# Patient Record
Sex: MALE | Race: ASIAN | NOT HISPANIC OR LATINO
[De-identification: names, ages, dates, MRNs, and addresses within clinical notes are randomized per-mention and may not be internally consistent; named-entity substitution may affect disease eponyms.]

---

## 2020-02-14 ENCOUNTER — APPOINTMENT (OUTPATIENT)
Dept: ORTHOPEDIC SURGERY | Facility: CLINIC | Age: 60
End: 2020-02-14
Payer: MEDICARE

## 2020-02-14 PROBLEM — Z00.00 ENCOUNTER FOR PREVENTIVE HEALTH EXAMINATION: Status: ACTIVE | Noted: 2020-02-14

## 2020-02-24 ENCOUNTER — APPOINTMENT (OUTPATIENT)
Dept: ORTHOPEDIC SURGERY | Facility: CLINIC | Age: 60
End: 2020-02-24
Payer: MEDICARE

## 2020-02-24 PROCEDURE — 99204 OFFICE O/P NEW MOD 45 MIN: CPT

## 2020-02-24 PROCEDURE — 73562 X-RAY EXAM OF KNEE 3: CPT | Mod: 50

## 2020-02-24 NOTE — DISCUSSION/SUMMARY
[de-identified] : Patient has a 2-1/2 month history of intermittent pain his clinical exam and history are all consistent with a lateral distal tear. We'll send him for an MRI at this point to evaluate the size and location of this tear. He'll contact us once the MRI is completed and ready for review.

## 2020-02-24 NOTE — REASON FOR VISIT
[Initial Visit] : an initial visit for [FreeTextEntry2] : RIGHT KNEE PAIN AND STIFFNESS-PAINFUL IN THE BACK MOSTLY-STARTED IN DEC 2019-HAD A FALL CAUSED BY PASSING OUT. **HAS HISTORY OF P.E**

## 2020-02-24 NOTE — PHYSICAL EXAM
[de-identified] : Right knee has posterior lateral joint line tenderness range of motion 0-115°. Quad tone is good his neurovascular status is some mild warmth about the knee but no obvious effusion. [de-identified] : AP standing individual lateral and sunrise views are obtained show well-preserved joint spaces in all 3 compartments of the right knee.

## 2020-02-24 NOTE — HISTORY OF PRESENT ILLNESS
[de-identified] : Patient present with a 2-1/2 month complaint of right knee posterior lateral joint line pain. He has had meniscus issues and is left knee but now the right knee has pain that he states is quite similar to what he was experiencing when he had his right knee. Patient fell 2 and half months ago and is having difficulty fully flexing the knee feels that something is catching the posterior aspect of the right knee.